# Patient Record
Sex: MALE | Race: BLACK OR AFRICAN AMERICAN | NOT HISPANIC OR LATINO | Employment: OTHER | URBAN - METROPOLITAN AREA
[De-identification: names, ages, dates, MRNs, and addresses within clinical notes are randomized per-mention and may not be internally consistent; named-entity substitution may affect disease eponyms.]

---

## 2021-12-30 ENCOUNTER — LAB VISIT (OUTPATIENT)
Dept: PRIMARY CARE CLINIC | Facility: OTHER | Age: 66
End: 2021-12-30
Attending: INTERNAL MEDICINE
Payer: MEDICARE

## 2021-12-30 DIAGNOSIS — Z20.822 ENCOUNTER FOR LABORATORY TESTING FOR COVID-19 VIRUS: ICD-10-CM

## 2021-12-30 PROCEDURE — U0003 INFECTIOUS AGENT DETECTION BY NUCLEIC ACID (DNA OR RNA); SEVERE ACUTE RESPIRATORY SYNDROME CORONAVIRUS 2 (SARS-COV-2) (CORONAVIRUS DISEASE [COVID-19]), AMPLIFIED PROBE TECHNIQUE, MAKING USE OF HIGH THROUGHPUT TECHNOLOGIES AS DESCRIBED BY CMS-2020-01-R: HCPCS | Performed by: INTERNAL MEDICINE

## 2022-01-01 LAB
SARS-COV-2 RNA RESP QL NAA+PROBE: DETECTED
SARS-COV-2- CYCLE NUMBER: 16

## 2022-01-03 DIAGNOSIS — U07.1 COVID-19 VIRUS DETECTED: ICD-10-CM

## 2022-08-19 ENCOUNTER — HOSPITAL ENCOUNTER (EMERGENCY)
Facility: HOSPITAL | Age: 67
Discharge: HOME | End: 2022-08-19
Attending: EMERGENCY MEDICINE
Payer: MEDICARE

## 2022-08-19 VITALS
TEMPERATURE: 98 F | SYSTOLIC BLOOD PRESSURE: 138 MMHG | OXYGEN SATURATION: 98 % | HEART RATE: 64 BPM | WEIGHT: 168 LBS | HEIGHT: 67 IN | RESPIRATION RATE: 18 BRPM | DIASTOLIC BLOOD PRESSURE: 83 MMHG | BODY MASS INDEX: 26.37 KG/M2

## 2022-08-19 DIAGNOSIS — K59.00 CONSTIPATION, UNSPECIFIED CONSTIPATION TYPE: ICD-10-CM

## 2022-08-19 DIAGNOSIS — B02.9 HERPES ZOSTER WITHOUT COMPLICATION: ICD-10-CM

## 2022-08-19 DIAGNOSIS — R10.32 LEFT GROIN PAIN: ICD-10-CM

## 2022-08-19 DIAGNOSIS — R93.5 ABNORMAL CT OF THE ABDOMEN: ICD-10-CM

## 2022-08-19 DIAGNOSIS — R10.9 LEFT FLANK PAIN: Primary | ICD-10-CM

## 2022-08-19 DIAGNOSIS — R10.9 LEFT SIDED ABDOMINAL PAIN: ICD-10-CM

## 2022-08-19 LAB
ALBUMIN SERPL BCP-MCNC: 3.7 G/DL (ref 3.5–5.2)
ALP SERPL-CCNC: 65 U/L (ref 55–135)
ALT SERPL W/O P-5'-P-CCNC: 12 U/L (ref 10–44)
ANION GAP SERPL CALC-SCNC: 6 MMOL/L (ref 8–16)
AST SERPL-CCNC: 20 U/L (ref 10–40)
BASOPHILS # BLD AUTO: 0.07 K/UL (ref 0–0.2)
BASOPHILS NFR BLD: 1.3 % (ref 0–1.9)
BILIRUB SERPL-MCNC: 0.6 MG/DL (ref 0.1–1)
BILIRUB UR QL STRIP: NEGATIVE
BUN SERPL-MCNC: 15 MG/DL (ref 8–23)
CALCIUM SERPL-MCNC: 9.1 MG/DL (ref 8.7–10.5)
CHLORIDE SERPL-SCNC: 110 MMOL/L (ref 95–110)
CLARITY UR: CLEAR
CO2 SERPL-SCNC: 23 MMOL/L (ref 23–29)
COLOR UR: YELLOW
CREAT SERPL-MCNC: 1.1 MG/DL (ref 0.5–1.4)
DIFFERENTIAL METHOD: ABNORMAL
EOSINOPHIL # BLD AUTO: 0.3 K/UL (ref 0–0.5)
EOSINOPHIL NFR BLD: 5.2 % (ref 0–8)
ERYTHROCYTE [DISTWIDTH] IN BLOOD BY AUTOMATED COUNT: 13 % (ref 11.5–14.5)
EST. GFR  (NO RACE VARIABLE): >60 ML/MIN/1.73 M^2
GLUCOSE SERPL-MCNC: 92 MG/DL (ref 70–110)
GLUCOSE UR QL STRIP: NEGATIVE
HCT VFR BLD AUTO: 42.9 % (ref 40–54)
HGB BLD-MCNC: 13.9 G/DL (ref 14–18)
HGB UR QL STRIP: NEGATIVE
IMM GRANULOCYTES # BLD AUTO: 0.02 K/UL (ref 0–0.04)
IMM GRANULOCYTES NFR BLD AUTO: 0.4 % (ref 0–0.5)
KETONES UR QL STRIP: NEGATIVE
LEUKOCYTE ESTERASE UR QL STRIP: NEGATIVE
LIPASE SERPL-CCNC: 15 U/L (ref 4–60)
LYMPHOCYTES # BLD AUTO: 1.4 K/UL (ref 1–4.8)
LYMPHOCYTES NFR BLD: 25.3 % (ref 18–48)
MCH RBC QN AUTO: 30.8 PG (ref 27–31)
MCHC RBC AUTO-ENTMCNC: 32.4 G/DL (ref 32–36)
MCV RBC AUTO: 95 FL (ref 82–98)
MONOCYTES # BLD AUTO: 0.4 K/UL (ref 0.3–1)
MONOCYTES NFR BLD: 7.2 % (ref 4–15)
NEUTROPHILS # BLD AUTO: 3.3 K/UL (ref 1.8–7.7)
NEUTROPHILS NFR BLD: 60.6 % (ref 38–73)
NITRITE UR QL STRIP: NEGATIVE
NRBC BLD-RTO: 0 /100 WBC
PH UR STRIP: 7 [PH] (ref 5–8)
PLATELET # BLD AUTO: 280 K/UL (ref 150–450)
PMV BLD AUTO: 9.7 FL (ref 9.2–12.9)
POTASSIUM SERPL-SCNC: 4.4 MMOL/L (ref 3.5–5.1)
PROT SERPL-MCNC: 7.3 G/DL (ref 6–8.4)
PROT UR QL STRIP: ABNORMAL
RBC # BLD AUTO: 4.52 M/UL (ref 4.6–6.2)
SODIUM SERPL-SCNC: 139 MMOL/L (ref 136–145)
SP GR UR STRIP: 1.03 (ref 1–1.03)
URN SPEC COLLECT METH UR: ABNORMAL
UROBILINOGEN UR STRIP-ACNC: ABNORMAL EU/DL
WBC # BLD AUTO: 5.38 K/UL (ref 3.9–12.7)

## 2022-08-19 PROCEDURE — 63600175 PHARM REV CODE 636 W HCPCS: Performed by: NURSE PRACTITIONER

## 2022-08-19 PROCEDURE — 85025 COMPLETE CBC W/AUTO DIFF WBC: CPT | Performed by: NURSE PRACTITIONER

## 2022-08-19 PROCEDURE — 96374 THER/PROPH/DIAG INJ IV PUSH: CPT

## 2022-08-19 PROCEDURE — 80053 COMPREHEN METABOLIC PANEL: CPT | Performed by: NURSE PRACTITIONER

## 2022-08-19 PROCEDURE — 81003 URINALYSIS AUTO W/O SCOPE: CPT | Performed by: NURSE PRACTITIONER

## 2022-08-19 PROCEDURE — 25500020 PHARM REV CODE 255: Performed by: EMERGENCY MEDICINE

## 2022-08-19 PROCEDURE — 83690 ASSAY OF LIPASE: CPT | Performed by: NURSE PRACTITIONER

## 2022-08-19 PROCEDURE — 99285 EMERGENCY DEPT VISIT HI MDM: CPT | Mod: 25

## 2022-08-19 RX ORDER — HYDROCODONE BITARTRATE AND ACETAMINOPHEN 5; 325 MG/1; MG/1
1 TABLET ORAL EVERY 6 HOURS PRN
Qty: 10 TABLET | Refills: 0 | Status: SHIPPED | OUTPATIENT
Start: 2022-08-19 | End: 2022-08-22

## 2022-08-19 RX ORDER — KETOROLAC TROMETHAMINE 30 MG/ML
10 INJECTION, SOLUTION INTRAMUSCULAR; INTRAVENOUS
Status: COMPLETED | OUTPATIENT
Start: 2022-08-19 | End: 2022-08-19

## 2022-08-19 RX ORDER — POLYETHYLENE GLYCOL 3350 17 G/17G
17 POWDER, FOR SOLUTION ORAL DAILY
Qty: 7 EACH | Refills: 0 | Status: SHIPPED | OUTPATIENT
Start: 2022-08-19 | End: 2022-08-26

## 2022-08-19 RX ORDER — VALACYCLOVIR HYDROCHLORIDE 1 G/1
1000 TABLET, FILM COATED ORAL 3 TIMES DAILY
Qty: 21 TABLET | Refills: 0 | Status: SHIPPED | OUTPATIENT
Start: 2022-08-19 | End: 2022-08-26

## 2022-08-19 RX ORDER — PREDNISONE 20 MG/1
40 TABLET ORAL DAILY
Qty: 10 TABLET | Refills: 0 | Status: SHIPPED | OUTPATIENT
Start: 2022-08-19 | End: 2022-08-24

## 2022-08-19 RX ADMIN — KETOROLAC TROMETHAMINE 10 MG: 30 INJECTION, SOLUTION INTRAMUSCULAR at 01:08

## 2022-08-19 RX ADMIN — IOHEXOL 75 ML: 350 INJECTION, SOLUTION INTRAVENOUS at 01:08

## 2022-08-19 NOTE — ED PROVIDER NOTES
Encounter Date: 8/19/2022    SCRIBE #1 NOTE: I, Eddie Hua, am scribing for, and in the presence of, CRISTIAN Salguero.       History     Chief Complaint   Patient presents with    Flank Pain     Pt c/o pain to left flank that began x1 week. Pt stated stated pain is nontraumatic and increases upon movement or palpation. Pt denied painful or difficulty urinating. Pt denied chest pain or SOB.     CC: Left flank pain, LLQ abdominal pain    HPI: Nanda Fitzgerald is a 66 y.o. male with a PMHx of right inguinal hernia who presents to the Emergency Department for evaluation of a 1 week history of acute, constant, burning pain to the left flank  that radiates to the LLQ of the abdomen and left groin. Patient denies any nausea, vomiting, diarrhea, urinary complaints, rash, testicular pain, or other associated symptoms. He reports that 2 days ago he drank cold water and had the sensation of the water going into his left flank. Patient denies any injury, trauma, recent heavy lifting. He recalls a history of right sided inguinal hernia requiring surgical repair on 4/23/2021.    There is no problem list on file for this patient.      The history is provided by the patient.     Review of patient's allergies indicates:  No Known Allergies  History reviewed. No pertinent past medical history.  Past Surgical History:   Procedure Laterality Date    HERNIA REPAIR       Family History   Problem Relation Age of Onset    Diabetes Mother     Hypertension Mother     Cancer Father     Diabetes Sister     Asthma Brother     Diabetes Brother      Social History     Tobacco Use    Smoking status: Never Smoker    Smokeless tobacco: Never Used   Substance Use Topics    Alcohol use: No    Drug use: No     Review of Systems   Constitutional: Negative for chills and fever.   HENT: Negative for congestion, ear discharge, ear pain, rhinorrhea, sore throat and trouble swallowing.    Eyes: Negative for visual disturbance.    Respiratory: Negative for cough and shortness of breath.    Cardiovascular: Negative for chest pain and leg swelling.   Gastrointestinal: Positive for abdominal pain. Negative for diarrhea, nausea and vomiting.   Genitourinary: Positive for flank pain. Negative for dysuria and testicular pain.   Musculoskeletal: Negative for back pain, neck pain and neck stiffness.   Skin: Negative for color change, rash and wound.   Neurological: Negative for seizures, syncope, speech difficulty, weakness and headaches.   Psychiatric/Behavioral: Negative for confusion.       Physical Exam     Initial Vitals [08/19/22 1134]   BP Pulse Resp Temp SpO2   138/83 64 18 98.3 °F (36.8 °C) 98 %      MAP       --         Physical Exam    Nursing note and vitals reviewed.  Constitutional: He appears well-developed and well-nourished. He is not diaphoretic. He is cooperative.  Non-toxic appearance. He does not have a sickly appearance. He does not appear ill. No distress.   HENT:   Head: Normocephalic and atraumatic.   Right Ear: External ear normal.   Left Ear: External ear normal.   Nose: Nose normal.   Mouth/Throat: Oropharynx is clear and moist and mucous membranes are normal. No trismus in the jaw.   Eyes: Conjunctivae and EOM are normal. No scleral icterus.   Neck: Phonation normal.   Normal range of motion.  Cardiovascular: Normal rate, regular rhythm and intact distal pulses.   Pulses:       Radial pulses are 2+ on the right side and 2+ on the left side.   Pulmonary/Chest: No tachypnea and no bradypnea. No respiratory distress. He has no wheezes. He has no rhonchi. He has no rales.   Abdominal: Abdomen is soft. He exhibits no distension. There is abdominal tenderness in the left lower quadrant.   Tenderness in left inguinal groin.  No palpable hernia appreciated on physical exam.  No CVA tenderness.   No right CVA tenderness.  No left CVA tenderness. There is no rebound.   Musculoskeletal:         General: Normal range of motion.       Cervical back: Normal range of motion. No rigidity. Normal range of motion.     Neurological: He is alert and oriented to person, place, and time. No sensory deficit. He exhibits normal muscle tone. Coordination and gait normal. GCS score is 15. GCS eye subscore is 4. GCS verbal subscore is 5. GCS motor subscore is 6.   Skin: Skin is warm and dry. Capillary refill takes less than 2 seconds. Rash noted. No bruising noted. Rash is vesicular. No erythema.        Psychiatric: He has a normal mood and affect. His behavior is normal. Judgment and thought content normal.         ED Course   Procedures  Labs Reviewed   CBC W/ AUTO DIFFERENTIAL - Abnormal; Notable for the following components:       Result Value    RBC 4.52 (*)     Hemoglobin 13.9 (*)     All other components within normal limits   COMPREHENSIVE METABOLIC PANEL - Abnormal; Notable for the following components:    Anion Gap 6 (*)     All other components within normal limits   URINALYSIS, REFLEX TO URINE CULTURE - Abnormal; Notable for the following components:    Protein, UA Trace (*)     Urobilinogen, UA 2.0-3.0 (*)     All other components within normal limits    Narrative:     Specimen Source->Urine   LIPASE          Imaging Results           CT Abdomen Pelvis With Contrast (Final result)  Result time 08/19/22 13:55:30    Final result by Anthony Pinzon MD (08/19/22 13:55:30)                 Impression:      This report was flagged in Epic as abnormal.    1. Mild wall thickening and indistinctness about the urinary bladder, may be on the basis of nondistention however correlation with urinalysis is recommended to exclude changes of cystitis.  2. Low attenuating lesion within the upper pole of the right kidney, measuring higher attenuation than would be suspected for a simple cyst.  This may be on the basis of artifact however nonemergent ultrasound is recommended to confirm cystic nature.  3. Moderate stool in the right colon.  4. Please see above for  additional findings.      Electronically signed by: Anthony Pinzon MD  Date:    08/19/2022  Time:    13:55             Narrative:    EXAMINATION:  CT ABDOMEN PELVIS WITH CONTRAST    CLINICAL HISTORY:  Flank pain, kidney stone suspected;LLQ abdominal pain;    TECHNIQUE:  Low dose axial images, sagittal and coronal reformations were obtained from the lung bases to the pubic symphysis following the IV administration of 75 mL of Omnipaque 350 .  Oral contrast was not given.    COMPARISON:  None.    FINDINGS:  Images of the lower thorax are remarkable for bilateral dependent atelectasis.    The spleen, gallbladder and adrenal glands are grossly unremarkable.  There is a irregularly enhancing lesion along the periphery of the right hepatic lobe measuring approximately 1.8 cm, enhancement pattern suggests hemangioma though incompletely characterized.  There is atrophic change of the pancreas without pancreatic ductal dilation.  The portal vein, splenic vein, SMV, celiac axis and SMA all are patent.  No significant biliary dilation or ascites.  The stomach is distended with ingested content without wall thickening.    The kidneys enhance symmetrically without hydronephrosis or nephrolithiasis.  There is a low attenuating lesion arising from the upper pole of the right kidney measuring 2.3 cm, attenuation of which is higher than would be expected for a simple cyst.  There is a low attenuating lesion arising from the interpolar region of the left kidney measuring 1.1 cm, too small for characterization.  The bilateral ureters are unremarkable without calculi seen.  The urinary bladder is decompressed noting there may be residual wall thickening.  There is slight indistinctness about the urinary bladder walls.  The prostate is mildly prominent.    There are a few scattered colonic diverticula without inflammation.  There is moderate stool in the right colon.  The terminal ileum and appendix are unremarkable.  The small bowel  is grossly unremarkable.  There are a few scattered shotty periaortic and paracaval lymph nodes.  There is atherosclerotic plaque and calcification of the aorta and its branches.  No focal organized pelvic fluid collection.    Degenerative changes are noted of the spine.  No significant inguinal lymphadenopathy.                                 Medications   ketorolac injection 9.999 mg (9.999 mg Intravenous Given 8/19/22 1310)   iohexoL (OMNIPAQUE 350) injection 75 mL (75 mLs Intravenous Given 8/19/22 1328)     Medical Decision Making:   History:   Old Medical Records: I decided to obtain old medical records.  Clinical Tests:   Lab Tests: Reviewed and Ordered  Radiological Study: Reviewed and Ordered       APC / Resident Notes:   This is an evaluation of a 66 y.o. male that presents to the Emergency Department for left flank pain with radiation to the left side and abdomen. Physical Exam shows a non-toxic, afebrile, and well appearing male.  There is tenderness palpation of the left lower quadrant of the abdomen and left groin.  There is a grouping of vesicular lesions along the left lateral side in a dermatomal pattern.  No palpable hernia appreciated on physical exam left groin.  No CVA tenderness. Vital signs are reassuring. If available, previous records reviewed. RESULTS:  Urinalysis without infection.  CBC without leukocytosis.  H&H 13.9 and 42.9.  Normal platelet count.  CMP with an anion gap of 6, otherwise unremarkable.  Normal lipase.  CT abdomen pelvis with low attenuating lesion in the right kidney as well as left kidney.  Discussed with the patient encouraged follow up with primary care.  Enlarged prostate, again encouraged follow up with primary care.  Moderate stool in the colon.  Will prescribe MiraLax.  Suspected hemangioma in the right lobe of the liver and diverticulosis also discussed with the patient.    My overall impression is left-sided flank pain with left abdominal pain left groin pain,  constipation, incidental findings on CT scan and shingles. I considered, but at this time, do not suspect bowel obstruction bowel perforation appendicitis, diverticulitis, cholecystitis, pancreatitis, UTI, pyelonephritis, incarcerated hernia.    Discharge Meds/Instructions:  Norco for pain, acyclovir, prednisone, MiraLax. The diagnosis, treatment plan, instructions for follow-up as well as ED return precautions were discussed. All questions have been answered. This case was discussed with and CT results discussed with my attending, Dr. Dorantes. ISAIAS Mcnamara, SARAH     Scribe Attestation:   Scribe #1: I performed the above scribed service and the documentation accurately describes the services I performed. I attest to the accuracy of the note.                 Clinical Impression:   Final diagnoses:  [R10.9] Left flank pain (Primary)  [R10.9] Left sided abdominal pain  [R10.32] Left groin pain  [B02.9] Herpes zoster without complication  [K59.00] Constipation, unspecified constipation type  [R93.5] Abnormal CT of the abdomen          ED Disposition Condition    Discharge Stable        ED Prescriptions     Medication Sig Dispense Start Date End Date Auth. Provider    valACYclovir (VALTREX) 1000 MG tablet Take 1 tablet (1,000 mg total) by mouth 3 (three) times daily. for 7 days 21 tablet 8/19/2022 8/26/2022 CRISTIAN Salguero    predniSONE (DELTASONE) 20 MG tablet Take 2 tablets (40 mg total) by mouth once daily. for 5 days 10 tablet 8/19/2022 8/24/2022 CRISTIAN Salguero    HYDROcodone-acetaminophen (NORCO) 5-325 mg per tablet Take 1 tablet by mouth every 6 (six) hours as needed for Pain. 10 tablet 8/19/2022 8/22/2022 CRISTIAN Salguero    polyethylene glycol (GLYCOLAX) 17 gram PwPk Take 17 g by mouth once daily. for 7 days 7 each 8/19/2022 8/26/2022 CRISTIAN Salguero        Follow-up Information     Follow up With Specialties Details Why Contact Info    Your Primary Care Doctor  Schedule an appointment  as soon as possible for a visit  Please call and schedule an appointment for follow up this week.     [facility] Emergency Dept Emergency Medicine Go to  If symptoms worsen [address] [place] [address] [phone]         IGARCÍA APRN, FNP-C, personally performed the services described in this documentation. All medical record entries made by the scribe were at my direction and in my presence. I have reviewed the chart and agree that the record reflects my personal performance and is accurate and complete.       Sampson Hu, CRISTIAN  08/19/22 1532

## 2022-08-19 NOTE — DISCHARGE INSTRUCTIONS
There was an incidental finding on your CATSCAN today.     There is a irregularly enhancing lesion along the periphery of the right hepatic lobe measuring approximately 1.8 cm, enhancement pattern suggests hemangioma though incompletely characterized.  There is a low attenuating lesion arising from the upper pole of the right kidney measuring 2.3 cm, attenuation of which is higher than would be expected for a simple cyst.  There is a low attenuating lesion arising from the interpolar region of the left kidney measuring 1.1 cm, too small for characterization.   The prostate is mildly prominent.     This is not likely causing your symptoms today, but you need to follow up with your Primary Care Doctor for re-evaluation and further monitoring of this finding to make sure it does not get worse over time.     § Please return to the Emergency Department for any new or worsening symptoms including: fever, chest pain, shortness of breath, loss of consciousness, dizziness, weakness, or any other concerns.     § Schedule an appointment for follow up with your Primary Care Doctor as soon as possible for a recheck of your symptoms. If you do not have one, contact the one listed on your discharge paperwork or call the [facility] Appointment Desk at [phone] to schedule an appointment.     § If you require follow up care from a specialist and are unable to schedule an appointment with them directly, please contact your Primary Care Provider on the next business day to set up a referral.      § Please take all medication as prescribed.